# Patient Record
Sex: FEMALE | Employment: FULL TIME | URBAN - METROPOLITAN AREA
[De-identification: names, ages, dates, MRNs, and addresses within clinical notes are randomized per-mention and may not be internally consistent; named-entity substitution may affect disease eponyms.]

---

## 2022-08-26 ENCOUNTER — OFFICE VISIT (OUTPATIENT)
Dept: ORTHOPEDIC SURGERY | Age: 67
End: 2022-08-26
Payer: MEDICARE

## 2022-08-26 VITALS — WEIGHT: 175 LBS | BODY MASS INDEX: 29.88 KG/M2 | HEIGHT: 64 IN

## 2022-08-26 DIAGNOSIS — M54.12 CERVICAL RADICULOPATHY: ICD-10-CM

## 2022-08-26 DIAGNOSIS — M25.512 LEFT SHOULDER PAIN, UNSPECIFIED CHRONICITY: ICD-10-CM

## 2022-08-26 DIAGNOSIS — M54.2 CERVICAL PAIN: Primary | ICD-10-CM

## 2022-08-26 PROCEDURE — G8419 CALC BMI OUT NRM PARAM NOF/U: HCPCS | Performed by: ORTHOPAEDIC SURGERY

## 2022-08-26 PROCEDURE — G8536 NO DOC ELDER MAL SCRN: HCPCS | Performed by: ORTHOPAEDIC SURGERY

## 2022-08-26 PROCEDURE — G8427 DOCREV CUR MEDS BY ELIG CLIN: HCPCS | Performed by: ORTHOPAEDIC SURGERY

## 2022-08-26 PROCEDURE — 3017F COLORECTAL CA SCREEN DOC REV: CPT | Performed by: ORTHOPAEDIC SURGERY

## 2022-08-26 PROCEDURE — G8432 DEP SCR NOT DOC, RNG: HCPCS | Performed by: ORTHOPAEDIC SURGERY

## 2022-08-26 PROCEDURE — G8400 PT W/DXA NO RESULTS DOC: HCPCS | Performed by: ORTHOPAEDIC SURGERY

## 2022-08-26 PROCEDURE — 1090F PRES/ABSN URINE INCON ASSESS: CPT | Performed by: ORTHOPAEDIC SURGERY

## 2022-08-26 PROCEDURE — 99214 OFFICE O/P EST MOD 30 MIN: CPT | Performed by: ORTHOPAEDIC SURGERY

## 2022-08-26 PROCEDURE — 1123F ACP DISCUSS/DSCN MKR DOCD: CPT | Performed by: ORTHOPAEDIC SURGERY

## 2022-08-26 PROCEDURE — 1101F PT FALLS ASSESS-DOCD LE1/YR: CPT | Performed by: ORTHOPAEDIC SURGERY

## 2022-08-26 RX ORDER — PREGABALIN 75 MG/1
75 CAPSULE ORAL 2 TIMES DAILY
Qty: 40 CAPSULE | Refills: 0 | Status: SHIPPED | OUTPATIENT
Start: 2022-08-26 | End: 2022-08-29

## 2022-08-26 RX ORDER — PREDNISONE 20 MG/1
TABLET ORAL
Qty: 5 TABLET | Refills: 0 | Status: SHIPPED | OUTPATIENT
Start: 2022-08-26 | End: 2022-08-29

## 2022-08-26 NOTE — LETTER
8/26/2022    Patient: Eula Ahumada   YOB: 1955   Date of Visit: 8/26/2022     Salma Smith MD  [facility]  [facility] [address] [facility] [address]  Via Fax: [phone]    Dear Salma Smith MD,      Thank you for referring Ms. Eula Ahumada to [facility] for evaluation. My notes for this consultation are attached. If you have questions, please do not hesitate to call me. I look forward to following your patient along with you.       Sincerely,    Dacia Sanchez, DO Pt on regular diet at home, education on GERD triggers and heart healthy fibers given. Pt on regular diet at home

## 2022-08-26 NOTE — PROGRESS NOTES
Eula Ahumada (: 1955) is a 79 y.o. female, established patient, here for evaluation of the following chief complaint(s):  Neck Pain       ASSESSMENT/PLAN:  Below is the assessment and plan developed based on review of pertinent history, physical exam, labs, studies, and medications. Findings were discussed with the patient today. We discussed regimen of ice, anti-inflammatories, physical therapy, home exercise program, and activity modifications. If there is continued pain and symptoms then we will plan for follow-up in the next 4-6 weeks for further evaluation and treatment planning. 1. Cervical pain  -     XR SPINE CERV PA LAT ODONT 3 V MAX; Future  -     predniSONE (DELTASONE) 20 mg tablet; Take 1 tablet daily, Normal, Disp-5 Tablet, R-0  -     pregabalin (Lyrica) 75 mg capsule; Take 1 Capsule by mouth two (2) times a day. Max Daily Amount: 150 mg., Normal, Disp-40 Capsule, R-0  2. Left shoulder pain, unspecified chronicity  -     XR SHOULDER LT AP/LAT MIN 2 V; Future  -     predniSONE (DELTASONE) 20 mg tablet; Take 1 tablet daily, Normal, Disp-5 Tablet, R-0  -     pregabalin (Lyrica) 75 mg capsule; Take 1 Capsule by mouth two (2) times a day. Max Daily Amount: 150 mg., Normal, Disp-40 Capsule, R-0  3. Cervical radiculopathy  -     REFERRAL TO PHYSICAL THERAPY      Return in about 6 weeks (around 10/7/2022), or if symptoms worsen or fail to improve. SUBJECTIVE/OBJECTIVE:  Eula Ahumada (: 1955) is a 79 y.o. female. She notes aching pain in the neck that radiates into the left shoulder and down the left upper extremity. She notes numbness and tingling into the hand. She has tried Medrol Dosepak recently but notes continued symptoms. No Known Allergies    Current Outpatient Medications   Medication Sig    predniSONE (DELTASONE) 20 mg tablet Take 1 tablet daily    pregabalin (Lyrica) 75 mg capsule Take 1 Capsule by mouth two (2) times a day. Max Daily Amount: 150 mg. eszopiclone (LUNESTA) 1 mg tablet Take 1 mg by mouth nightly. rotigotine (NEUPRO) 2 mg/24 hour patch 1 Patch by TransDERmal route daily. rosuvastatin (CRESTOR) 20 mg tablet Take 10 mg by mouth daily. amLODIPine (NORVASC) 2.5 mg tablet Take 5 mg by mouth two (2) times a day. potassium chloride SR (KLOR-CON 10) 10 mEq tablet Take 20 mEq by mouth daily. MAGNESIUM CARBONATE PO Take  by mouth.    quiNINE 324 mg capsule Take 324 mg by mouth nightly as needed. esomeprazole (NEXIUM) 40 mg capsule Take 40 mg by mouth as needed. calcium 600 mg Cap Take 1,200 mg by mouth. No current facility-administered medications for this visit. Social History     Socioeconomic History    Marital status: UNKNOWN     Spouse name: Not on file    Number of children: Not on file    Years of education: Not on file    Highest education level: Not on file   Occupational History    Not on file   Tobacco Use    Smoking status: Former    Smokeless tobacco: Not on file   Substance and Sexual Activity    Alcohol use: No    Drug use: No    Sexual activity: Not on file   Other Topics Concern    Not on file   Social History Narrative    Not on file     Social Determinants of Health     Financial Resource Strain: Not on file   Food Insecurity: Not on file   Transportation Needs: Not on file   Physical Activity: Not on file   Stress: Not on file   Social Connections: Not on file   Intimate Partner Violence: Not on file   Housing Stability: Not on file       Past Surgical History:   Procedure Laterality Date    HX CHOLECYSTECTOMY      HX HYSTERECTOMY      HX OTHER SURGICAL      hemrrhoidectomy    NE ABDOMEN SURGERY PROC UNLISTED      bowel obstruction       History reviewed. No pertinent family history. OB History    No obstetric history on file.             REVIEW OF SYSTEMS:  ROS    Positive for: Musculoskeletal  Last edited by Giselle Mercado on 8/26/2022  8:31 AM.        Patient denies any recent fever, chills, nausea, vomiting, chest pain, or shortness of breath. Vitals:  Ht 5' 4\" (1.626 m)   Wt 175 lb (79.4 kg)   BMI 30.04 kg/m²    Body mass index is 30.04 kg/m². PHYSICAL EXAM:  General exam: Patient is awake, alert, and oriented x3. Well-appearing. No acute distress. Ambulates with a normal gait. Neck: There is tenderness to palpation in the paraspinal region. No obvious midline tenderness to palpation. There is stiffness with flexion and extension of the cervical spine. Negative Spurling's exam.  No erythema or ecchymosis. There is normal range of motion of both shoulders and minimal pain with impingement testing including Garrett exam.  Grossly neurovascular and sensory intact. IMAGING:    XR Results (most recent):  Results from Hospital Encounter encounter on 02/16/13    XR ANKLE RT MIN 3 V    Narrative  **Final Report**      ICD Codes / Adm. Diagnosis: 546069   / Ankle Pain  Examination:  CR ANKLE MIN 3 [facility] RT  - [id number] - Feb 16 2013  5:14PM  Accession No:  [id number]  Reason:  pain      REPORT:  INDICATION:  pain    Exam: 3 views of the right ankle. No comparisons. FINDINGS: Alignment is normal. Bone mineral density is normal. No evidence  of acute fracture or bony destructive change. No significant swelling. IMPRESSION:  1. No acute bony abnormality          Signing/Reading Doctor: Arvin Hernandez ([id number])  Trent Bianchi ([id number])  Feb 16 2013  5:17PM      Results from Hospital Encounter encounter on 08/30/09    XR FOOT COMPLETE          ICD Codes / Adm. Diagnosis:    /   RT FOOT PAIN  Examination:  FOOT MIN 3 [facility] R - [id number] - Aug 30 2009 11:21AM  Accession No:  [id number]  Reason:  REASON: PAIN      REPORT:  Right foot    INDICATION: See above. COMPARISON: None. FINDINGS: Three views  of the right foot demonstrate no fracture or other  osseous, articular or soft tissue abnormality. IMPRESSION: Normal right foot.         Interpreting/Reading Doctor: JIGNA Reuben Enrique ([id number])  Transcribed: n/a on 08/30/2009  Approved: Venecia Luís ([id number])  08/30/2009        Distribution:  Attending Doctor: Sotero Virk Doctor: Harl Holstein         Orders Placed This Encounter    XR SHOULDER LT AP/LAT MIN 2 V     1     Standing Status:   Future     Number of Occurrences:   1     Standing Expiration Date:   8/27/2023    XR SPINE CERV 2- 3 VWS     Standing Status:   Future     Number of Occurrences:   1     Standing Expiration Date:   8/26/2023    REFERRAL TO PHYSICAL THERAPY     Referral Priority:   Routine     Referral Type:   PT/OT/ST     Referral Reason:   Specialty Services Required     Number of Visits Requested:   1    predniSONE (DELTASONE) 20 mg tablet     Sig: Take 1 tablet daily     Dispense:  5 Tablet     Refill:  0    pregabalin (Lyrica) 75 mg capsule     Sig: Take 1 Capsule by mouth two (2) times a day. Max Daily Amount: 150 mg. Dispense:  40 Capsule     Refill:  0              An electronic signature was used to authenticate this note.   -- Christy Lerner, DO

## 2022-08-29 RX ORDER — PREDNISONE 20 MG/1
TABLET ORAL
Qty: 5 TABLET | Refills: 0 | Status: SHIPPED | OUTPATIENT
Start: 2022-08-29

## 2022-08-29 RX ORDER — PREGABALIN 75 MG/1
75 CAPSULE ORAL 2 TIMES DAILY
Qty: 40 CAPSULE | Refills: 0 | Status: SHIPPED | OUTPATIENT
Start: 2022-08-29